# Patient Record
Sex: FEMALE
[De-identification: names, ages, dates, MRNs, and addresses within clinical notes are randomized per-mention and may not be internally consistent; named-entity substitution may affect disease eponyms.]

---

## 2020-12-16 ENCOUNTER — APPOINTMENT (OUTPATIENT)
Dept: BARIATRICS | Facility: CLINIC | Age: 45
End: 2020-12-16
Payer: MEDICAID

## 2020-12-16 VITALS
SYSTOLIC BLOOD PRESSURE: 129 MMHG | HEIGHT: 64 IN | DIASTOLIC BLOOD PRESSURE: 82 MMHG | OXYGEN SATURATION: 98 % | WEIGHT: 162 LBS | BODY MASS INDEX: 27.66 KG/M2 | HEART RATE: 75 BPM | TEMPERATURE: 97 F

## 2020-12-16 DIAGNOSIS — Z78.9 OTHER SPECIFIED HEALTH STATUS: ICD-10-CM

## 2020-12-16 DIAGNOSIS — R10.9 UNSPECIFIED ABDOMINAL PAIN: ICD-10-CM

## 2020-12-16 DIAGNOSIS — E66.9 OBESITY, UNSPECIFIED: ICD-10-CM

## 2020-12-16 PROBLEM — Z00.00 ENCOUNTER FOR PREVENTIVE HEALTH EXAMINATION: Status: ACTIVE | Noted: 2020-12-16

## 2020-12-16 PROCEDURE — 99072 ADDL SUPL MATRL&STAF TM PHE: CPT

## 2020-12-16 PROCEDURE — 99204 OFFICE O/P NEW MOD 45 MIN: CPT

## 2020-12-16 NOTE — HISTORY OF PRESENT ILLNESS
[de-identified] : Patient is a 44 yo F s/o abdominoplasty in [place] 4 months ago c/b wound infection. Patient started having discharge from surgical site and developed erythema. She has recently undergone a CT which showed a linear 10 cm collection with air anterior to the rectus abdominins. Patient to start Cefoxime and linezolid today. She has minimal discharge from wound. No fevers.

## 2020-12-16 NOTE — ASSESSMENT
[FreeTextEntry1] : Patient is a 44 yo F s/o abdominoplasty in [place] 4 months ago c/b wound infection. Patient started having discharge from surgical site and developed erythema. She has recently undergone a CT which showed a linear 10 cm collection with air anterior to the rectus abdominins. Patient to start Cefoxime and linezolid today. She has minimal discharge from wound. No fevers.